# Patient Record
Sex: MALE | Race: WHITE
[De-identification: names, ages, dates, MRNs, and addresses within clinical notes are randomized per-mention and may not be internally consistent; named-entity substitution may affect disease eponyms.]

---

## 2018-12-06 ENCOUNTER — HOSPITAL ENCOUNTER (OUTPATIENT)
Dept: HOSPITAL 97 - OR | Age: 58
Discharge: HOME | End: 2018-12-06
Attending: SURGERY
Payer: COMMERCIAL

## 2018-12-06 DIAGNOSIS — F17.210: ICD-10-CM

## 2018-12-06 DIAGNOSIS — K43.9: Primary | ICD-10-CM

## 2018-12-06 PROCEDURE — 0WUF0JZ SUPPLEMENT ABDOMINAL WALL WITH SYNTHETIC SUBSTITUTE, OPEN APPROACH: ICD-10-PCS

## 2018-12-06 PROCEDURE — 93005 ELECTROCARDIOGRAM TRACING: CPT

## 2018-12-06 PROCEDURE — 88302 TISSUE EXAM BY PATHOLOGIST: CPT

## 2018-12-06 RX ADMIN — HYDROMORPHONE HYDROCHLORIDE ONE MG: 1 INJECTION, SOLUTION INTRAMUSCULAR; INTRAVENOUS; SUBCUTANEOUS at 11:47

## 2018-12-06 RX ADMIN — MEPERIDINE HYDROCHLORIDE ONE MG: 25 INJECTION, SOLUTION INTRAMUSCULAR; INTRAVENOUS; SUBCUTANEOUS at 11:18

## 2018-12-06 RX ADMIN — HYDROMORPHONE HYDROCHLORIDE ONE MG: 1 INJECTION, SOLUTION INTRAMUSCULAR; INTRAVENOUS; SUBCUTANEOUS at 11:42

## 2018-12-06 RX ADMIN — MEPERIDINE HYDROCHLORIDE ONE MG: 50 INJECTION, SOLUTION INTRAMUSCULAR; INTRAVENOUS; SUBCUTANEOUS at 11:38

## 2018-12-06 RX ADMIN — MEPERIDINE HYDROCHLORIDE ONE MG: 25 INJECTION, SOLUTION INTRAMUSCULAR; INTRAVENOUS; SUBCUTANEOUS at 11:23

## 2018-12-06 RX ADMIN — MEPERIDINE HYDROCHLORIDE ONE MG: 50 INJECTION, SOLUTION INTRAMUSCULAR; INTRAVENOUS; SUBCUTANEOUS at 11:33

## 2018-12-06 NOTE — P.OP
Preoperative diagnosis: Supra-Umbilical Hernia


Postoperative diagnosis: Supra-Umbilical Hernia


Primary procedure: Open Repair of Supra-Umbilical Hernia with Mesh


Anesthesia: GETA + Local


Estimated blood loss: <2cc


Specimen: Hernia contents


Findings: ~1.5cm hernia defect


Complications: None


Implants: 4.3cm bard round mesh


Transferred to: Recovery Room


Condition: Good

## 2018-12-06 NOTE — XMS REPORT
Patient Summary Document

 Created on:2018



Patient:MALIK CARTER

Sex:Male

:1960

External Reference #:[id number]





Demographics







 Address  [address]



   [place] [address]

 

 Home Phone  [phone]

 

 Work Phone  [phone]

 

 Email Address  [email or web address]

 

 Preferred Language  Unknown

 

 Marital Status  Unknown

 

 Muslim Affiliation  Unknown

 

 Race  Unknown

 

 Additional Race(s)  Unavailable

 

 Ethnic Group  Unknown









Author







 Organization  [facility]

 

 Address  [address]



   [place] [address]

 

 Phone  [phone]









Care Team Providers







 Name  Role  Phone

 

 LEXI HARVEY  Primary Care Provider  Unavailable

 

 LEXI HARVEY  Unavailable  Unavailable









Problems

This patient has no known problems.



Allergies, Adverse Reactions, Alerts

This patient has no known allergies or adverse reactions.



Medications

This patient has no known medications.



Results







 Test Description  Test Time  Test Comments  Text Results  Atomic Results  
Result Comments









 Lipid Profile  2017 18:39:00    









   Test Item  Value  Reference Range  Comments









 Cholesterol (test code=CHOL)  141 mg/dL  0-200  

 

 Triglycerides (test code=TRIG)  39 mg/dL  9-200  

 

 HDL (test code=HDL)  69 mg/dL  50-60  

 

 Chol/HDL (test code=CHOLPHDL)  2.0 Ratio  0.0-4.4  

 

 LDL, Calculated (test  64 mg/dL  0-130  (NOTE)RISK OF HEART DISEASEPublished



 code=LDLC)      by American Heart AssociationAnalyte



                       Optimal



       Boderline            Increased



       RiskCHOL                  <200



                 200-239



       >240TRIG                    <150



                   150-199



        >200HDL Male:           >60



       



          <40HDL Female:       &gt;60



       



              <50LDL



       <100                130-159



                 >160LDL



         NEAR OPTIMAL -129

 

 VLDL (test code=VLDL)  8 mg/dL  5-40  

 

 LDL/HDL (test code=LDLPHDL)  1

## 2018-12-06 NOTE — OP
Date of Procedure:  12/06/2018



Surgeon:  Kevan Swain MD, MD



Preoperative Diagnosis:  Supraumbilical hernia.



Postoperative Diagnosis:  Supraumbilical hernia.



Procedure Performed:  Open repair of supraumbilical hernia with mesh.



Anesthesia:  General endotracheal plus local with 0.25% Marcaine.



Estimated Blood Loss:  Less than 2 cc.



Specimens:  Hernia contents.



Findings:  A 1.5 cm hernia defect.



Complications:  None.



Implants:  A 4.3 cm Bard, round, Ventralight mesh.



Disposition:  Transferred to recovery room in good condition.



Procedure In Detail:  After informed consent was obtained, the patient was brought to the operating r
oom, prepped and draped in the usual sterile fashion.  After adequate anesthesia was achieved, a supr
aumbilical incision was made over the area of the hernia defect.  Dissection was continued down throu
gh the subcutaneous fat to expose the hernia sac, which had a quite a narrow neck approximately 1.5 c
m.  This was circumferentially dissected.  The hernia sac was opened and the hernia contents were del
ivered out and ligated at this time and had a hyperemic appearance.  The remainder of the abdominal c
ontents returned to the preperitoneal space and a finger sweep was performed to ensure that there was
 nothing obstructing the approximation of the mesh to the anterior abdominal wall.  Two interrupted 2
-0 PDS sutures were passed through the fascia and through the 4.3 cm Bard Ventralight round mesh to p
lace in the preperitoneal space and the fascia was grasped with Kocher clamps and elevated.  The mesh
 was then brought up using the Sail to good close approximation of the anterior abdominal wall and a 
finger sweep was performed to ensure there were no contents between the abdominal wall and the mesh a
nd then the mesh was secured to the anterior abdominal wall with 2 stitches as described.  The fascia
 over the top was then closed through the Sail portion of the mesh to imbricate the tissues over the 
top for double-layer closure.  The Sail was then removed appropriately and the area was copiously irr
igated.  The skin was then closed with a 4-0 Monocryl in a running fashion.  Dermabond was placed ove
r the top.  The patient tolerated the procedure well without evidence of complication, transferred to
 the PACU in good condition.  All counts were correct at the end of the case.





MAGGIE/HANNAH

DD:  12/06/2018 10:50:15Voice ID:  [id number]

DT:  12/06/2018 21:46:09Report ID:  [id number]

## 2018-12-06 NOTE — EKG
Test Date:    2018-12-06               Test Time:    09:21:08

Technician:   TAYLOR                                    

                                                     

MEASUREMENT RESULTS:                                       

Intervals:                                           

Rate:         60                                     

RI:           166                                    

QRSD:         88                                     

QT:           384                                    

QTc:          384                                    

Axis:                                                

P:            82                                     

RI:           166                                    

QRS:          84                                     

T:            85                                     

                                                     

INTERPRETIVE STATEMENTS:                                       

                                                     

Normal sinus rhythm

Normal ECG

No previous ECG available for comparison



Electronically Signed On 12-06-18 11:27:37 CST by Maruo Sin